# Patient Record
Sex: MALE | Race: WHITE | RURAL
[De-identification: names, ages, dates, MRNs, and addresses within clinical notes are randomized per-mention and may not be internally consistent; named-entity substitution may affect disease eponyms.]

---

## 2020-09-04 ENCOUNTER — OFFICE VISIT (OUTPATIENT)
Dept: PRIMARY CARE CLINIC | Age: 65
End: 2020-09-04

## 2020-09-04 DIAGNOSIS — Z20.828 EXPOSURE TO SARS-ASSOCIATED CORONAVIRUS: Primary | ICD-10-CM

## 2020-09-04 NOTE — PROGRESS NOTES
Pt presents to the flu clinic for covid testing. He returned from [facility] 2 days ago and is currently in quarantine. He wants to confirm that he doesn't have covid in addition to the quarantine. He denies sx and known exposure at this time. Pt declined to see a doctor today.  KT

## 2020-09-06 LAB — SARS-COV-2, NAA: NOT DETECTED
